# Patient Record
Sex: MALE | Race: WHITE | NOT HISPANIC OR LATINO | URBAN - METROPOLITAN AREA
[De-identification: names, ages, dates, MRNs, and addresses within clinical notes are randomized per-mention and may not be internally consistent; named-entity substitution may affect disease eponyms.]

---

## 2021-01-15 ENCOUNTER — HISTORICAL (OUTPATIENT)
Dept: RADIOLOGY | Facility: HOSPITAL | Age: 71
End: 2021-01-15

## 2022-04-10 ENCOUNTER — HISTORICAL (OUTPATIENT)
Dept: ADMINISTRATIVE | Facility: HOSPITAL | Age: 72
End: 2022-04-10

## 2022-04-24 VITALS
WEIGHT: 174.81 LBS | SYSTOLIC BLOOD PRESSURE: 151 MMHG | HEIGHT: 70 IN | BODY MASS INDEX: 25.03 KG/M2 | DIASTOLIC BLOOD PRESSURE: 88 MMHG

## 2022-11-29 DIAGNOSIS — I65.23 BILATERAL CAROTID ARTERY STENOSIS: Primary | ICD-10-CM

## 2022-12-08 ENCOUNTER — OFFICE VISIT (OUTPATIENT)
Dept: CARDIAC SURGERY | Facility: CLINIC | Age: 72
End: 2022-12-08
Payer: MEDICARE

## 2022-12-08 VITALS
WEIGHT: 185 LBS | DIASTOLIC BLOOD PRESSURE: 93 MMHG | BODY MASS INDEX: 26.48 KG/M2 | HEIGHT: 70 IN | SYSTOLIC BLOOD PRESSURE: 137 MMHG | HEART RATE: 58 BPM

## 2022-12-08 DIAGNOSIS — I65.23 BILATERAL CAROTID ARTERY STENOSIS: Primary | ICD-10-CM

## 2022-12-08 PROCEDURE — 99212 PR OFFICE/OUTPT VISIT, EST, LEVL II, 10-19 MIN: ICD-10-PCS | Mod: ,,, | Performed by: THORACIC SURGERY (CARDIOTHORACIC VASCULAR SURGERY)

## 2022-12-08 PROCEDURE — 99212 OFFICE O/P EST SF 10 MIN: CPT | Mod: ,,, | Performed by: THORACIC SURGERY (CARDIOTHORACIC VASCULAR SURGERY)

## 2022-12-08 RX ORDER — EZETIMIBE 10 MG/1
10 TABLET ORAL
COMMUNITY
Start: 2022-11-27

## 2022-12-08 RX ORDER — ALIROCUMAB 75 MG/ML
INJECTION, SOLUTION SUBCUTANEOUS
COMMUNITY
Start: 2022-11-27

## 2022-12-08 RX ORDER — MEMANTINE HCL 10 MG
10 TABLET ORAL 2 TIMES DAILY
COMMUNITY
Start: 2022-09-22

## 2022-12-08 RX ORDER — RAMIPRIL 5 MG/1
5 CAPSULE ORAL
COMMUNITY
Start: 2022-10-27

## 2022-12-08 RX ORDER — ACYCLOVIR 800 MG/1
800 TABLET ORAL 2 TIMES DAILY
COMMUNITY
Start: 2022-10-14

## 2022-12-08 NOTE — PROGRESS NOTES
"Subjective:      Patient ID: Ravi San is a 72 y.o. male.    Chief Complaint: Follow-up (1yr Carotid us )      HPI:  The patient feels well and has no chest pain or shortness of breath.  Today's ultrasound shows the left carotid is widely patent.  The right carotid has minimal luminal disease      Current Outpatient Medications:     acyclovir (ZOVIRAX) 800 MG Tab, Take 800 mg by mouth 2 (two) times daily., Disp: , Rfl:     ezetimibe (ZETIA) 10 mg tablet, Take 10 mg by mouth., Disp: , Rfl:     NAMENDA 10 mg Tab, Take 10 mg by mouth 2 (two) times daily., Disp: , Rfl:     PRALUENT PEN 75 mg/mL PnIj, Inject into the skin., Disp: , Rfl:     ramipriL (ALTACE) 5 MG capsule, Take 5 mg by mouth., Disp: , Rfl:   Review of patient's allergies indicates:  No Known Allergies    BP (!) 137/93   Pulse (!) 58   Ht 5' 10" (1.778 m)   Wt 83.9 kg (185 lb)   BMI 26.54 kg/m²     Review of Systems   Constitutional: Negative.   HENT: Negative.    Eyes: Negative.    Cardiovascular: Negative.    Respiratory: Negative.    Endocrine: Negative.    Hematologic/Lymphatic: Negative.    Skin: Negative.    Musculoskeletal: Negative.    Gastrointestinal: Negative.    Genitourinary: Negative.    Neurological: Negative.    Psychiatric/Behavioral: Negative.    Allergic/Immunologic: Negative.        Objective:     Constitutional:  No acute distress  HENT:  Supple without mass.  Trachea midline.  No carotid bruits  Eyes:  PERRLA  Cardiovascular:  Regular rate and rhythm without murmur rub or gallop  Respiratory:  Clear to auscultation  Endocrine:  Hematologic/Lymphatic:   Skin:  Warm and dry  Musculoskeletal:  No gross deformity  Gastrointestinal:   Genitourinary:   Neurological:  Normal  Psychiatric/Behavioral:   Extremities:  No cyanosis clubbing or edema    Assessment:  Stable status     Imaging:  Carotid ultrasound reviewed      Plan:  Return visit in 1 year with carotid ultrasound             "